# Patient Record
Sex: FEMALE | Race: WHITE | NOT HISPANIC OR LATINO | Employment: STUDENT | URBAN - METROPOLITAN AREA
[De-identification: names, ages, dates, MRNs, and addresses within clinical notes are randomized per-mention and may not be internally consistent; named-entity substitution may affect disease eponyms.]

---
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
## 2023-03-10 ENCOUNTER — OFFICE VISIT (OUTPATIENT)
Dept: URGENT CARE | Facility: CLINIC | Age: 18
End: 2023-03-10
Payer: COMMERCIAL

## 2023-03-10 VITALS — WEIGHT: 146 LBS | BODY MASS INDEX: 20.9 KG/M2 | RESPIRATION RATE: 18 BRPM | HEIGHT: 70 IN

## 2023-03-10 DIAGNOSIS — Z20.2 EXPOSURE TO STD: Primary | ICD-10-CM

## 2023-03-10 PROCEDURE — 99213 OFFICE O/P EST LOW 20 MIN: CPT | Mod: S$GLB,,, | Performed by: NURSE PRACTITIONER

## 2023-03-10 PROCEDURE — 99213 PR OFFICE/OUTPT VISIT, EST, LEVL III, 20-29 MIN: ICD-10-PCS | Mod: S$GLB,,, | Performed by: NURSE PRACTITIONER

## 2023-03-10 RX ORDER — DOXYCYCLINE HYCLATE 100 MG
100 TABLET ORAL 2 TIMES DAILY
COMMUNITY
Start: 2023-03-10

## 2023-03-10 RX ORDER — LEVONORGESTREL 19.5 MG/1
INTRAUTERINE DEVICE INTRAUTERINE
COMMUNITY
Start: 2023-03-06

## 2023-03-10 NOTE — PROGRESS NOTES
"Subjective:       Patient ID: Dorie Maldonado is a 17 y.o. female.    Vitals:  height is 5' 10" (1.778 m) and weight is 66.2 kg (146 lb). Her respiration is 18.     Chief Complaint: Exposure to STD (Pt stated possible exposure to STD. )        Patient is a 17 year old female who presents to urgent care for evaluation for STD exposure. She was seen and evaluated by her OBGYN, tested positive for Chlamydia and started on doxycyline. Patient reports, she was told boyfriend he tested negative. She is now confused as to how she got Chlamydia, since he the only person she having intercourse.         Exposure to STD   The patient's pertinent negatives include no dysuria. The vaginal discharge was normal. Pertinent negatives include no fever. She has tried antibiotics for the symptoms. The treatment provided mild relief.     Constitution: Negative for chills, fatigue and fever.   Neck: neck negative.   Cardiovascular: Negative.    Eyes: Negative.    Respiratory: Negative.     Gastrointestinal: Negative.    Genitourinary:  Negative for dysuria, flank pain, vaginal pain and vaginal discharge.   Skin: Negative.      Objective:      Physical Exam   Constitutional: She is oriented to person, place, and time. She appears well-developed. She is cooperative.   HENT:   Head: Normocephalic and atraumatic.   Ears:   Right Ear: Hearing, tympanic membrane, external ear and ear canal normal.   Left Ear: Hearing, tympanic membrane, external ear and ear canal normal.   Nose: Nose normal. No mucosal edema or nasal deformity. No epistaxis. Right sinus exhibits no maxillary sinus tenderness and no frontal sinus tenderness. Left sinus exhibits no maxillary sinus tenderness and no frontal sinus tenderness.   Mouth/Throat: Uvula is midline, oropharynx is clear and moist and mucous membranes are normal. No trismus in the jaw. Normal dentition. No uvula swelling.   Eyes: Conjunctivae and lids are normal.   Neck: Trachea normal and phonation normal. " Neck supple.   Cardiovascular: Normal rate, regular rhythm, normal heart sounds and normal pulses.   Pulmonary/Chest: Effort normal and breath sounds normal.   Abdominal: Normal appearance and bowel sounds are normal. Soft.   Musculoskeletal: Normal range of motion.         General: Normal range of motion.   Neurological: She is alert and oriented to person, place, and time. She exhibits normal muscle tone.   Skin: Skin is warm, dry and intact.   Psychiatric: Her speech is normal and behavior is normal. Judgment and thought content normal.   Nursing note and vitals reviewed.      Assessment:       1. Exposure to STD          Plan:         Exposure to STD               Patient presents to urgent care for evaluation for STD exposure. She was recently seen and evaluated by her OBGYN, tested positive for Chlamydia and started on doxycyline. She was told boyfriend he tested negative. She is now confused as to how she got Chlamydia, since he the only person she having intercourse.   I had a long conversation with patient. She was advised to continue doxycyline and treatment ordered by her OBGYN. Patient advised to practise safe sex or avoid sex. She reports boyfriend will be retested.